# Patient Record
Sex: FEMALE | Race: WHITE | NOT HISPANIC OR LATINO | Employment: UNEMPLOYED | URBAN - METROPOLITAN AREA
[De-identification: names, ages, dates, MRNs, and addresses within clinical notes are randomized per-mention and may not be internally consistent; named-entity substitution may affect disease eponyms.]

---

## 2018-09-04 ENCOUNTER — OFFICE VISIT (OUTPATIENT)
Dept: OBSTETRICS AND GYNECOLOGY | Age: 58
End: 2018-09-04

## 2018-09-04 VITALS
WEIGHT: 166 LBS | BODY MASS INDEX: 24.59 KG/M2 | DIASTOLIC BLOOD PRESSURE: 80 MMHG | SYSTOLIC BLOOD PRESSURE: 140 MMHG | HEIGHT: 69 IN

## 2018-09-04 DIAGNOSIS — Z12.31 SCREENING MAMMOGRAM, ENCOUNTER FOR: Primary | ICD-10-CM

## 2018-09-04 DIAGNOSIS — Z12.4 ENCOUNTER FOR SCREENING FOR CERVICAL CANCER: ICD-10-CM

## 2018-09-04 DIAGNOSIS — N95.2 ATROPHIC VAGINITIS: ICD-10-CM

## 2018-09-04 PROCEDURE — 99386 PREV VISIT NEW AGE 40-64: CPT | Performed by: OBSTETRICS & GYNECOLOGY

## 2018-09-04 RX ORDER — CEPHALEXIN 250 MG/1
CAPSULE ORAL
Refills: 1 | COMMUNITY
Start: 2018-08-29 | End: 2021-02-04

## 2018-09-04 NOTE — PROGRESS NOTES
Routine Annual Visit    2018    Patient: Jinny Luciano          MR#:[id number]      Chief Complaint   Patient presents with   • Annual Exam     PT HERE FOR ROUTINE AE, HAD MG DONE . LAST C-SCOPE  NEVER HAD DEXA. NO PROBLEMS TODAY JUST IN NEED OF AE HAS BEEN YEARS SINCE LAST.       History of Present Illness    58 y.o. female  who presents for annual exam. Has been years since an annual. Last mammogram , unsure when last pap but probably around the same time. Denies abnl pap, mammogram was normal. Hx colonoscopy in . Maternal grandmother w breast ca at age 40, passed away age 42. Pt states that her mother and her multiple sisters have not had any cancers and not interested in genetic screening or enhanced breast ca screening.  Issue w postcoital UTI and taking antibiotic intermittently    No LMP recorded. Patient is postmenopausal.  Obstetric History:  OB History      Para Term  AB Living    7 7 7     7    SAB TAB Ectopic Molar Multiple Live Births                        Menstrual History:     No LMP recorded. Patient is postmenopausal.       Sexual History:   active w , declines STI screening. Some dysparuenia and dryness, uses [place]    ________________________________________  Patient Active Problem List   Diagnosis   • Encounter for screening for cervical cancer    • Screening mammogram, encounter for       Past Medical History:   Diagnosis Date   • Urinary tract infection        Past Surgical History:   Procedure Laterality Date   • APPENDECTOMY     •  SECTION         History   Smoking Status   • Never Smoker   Smokeless Tobacco   • Never Used       has a current medication list which includes the following prescription(s): cephalexin.  ________________________________________    Current contraception: none  History of abnormal Pap smear: no  Family history of Breast cancer: yes see above  Family history of uterine or ovarian cancer: no  Family History of colon  "cancer/colon polyps: yes - elderly uncle w colon ca  History of abnormal mammogram: no      The following portions of the patient's history were reviewed and updated as appropriate: allergies, current medications, past family history, past medical history, past social history, past surgical history and problem list.    Review of Systems    A comprehensive review of systems was negative.     Objective   Physical Exam    /80   Ht 175.3 cm (69\")   Wt 75.3 kg (166 lb)   BMI 24.51 kg/m²    BP Readings from Last 3 Encounters:   09/04/18 140/80      Wt Readings from Last 3 Encounters:   09/04/18 75.3 kg (166 lb)         BMI: Body mass index is 24.51 kg/m².       General:   alert, appears stated age and cooperative   Heart:: regular rate and rhythm, S1, S2 normal, no murmur, click, rub or gallop   Lungs: normal respiratory effort and auscultation   Abdomen: soft, non-tender, without masses or organomegaly   Breast: inspection negative, no nipple discharge or bleeding, no masses or nodularity palpable   Urethra and bladder: urethral meatus normal; bladder nontender to palpation;   Vulva: normal, Bartholin's, Urethra, Parkland's normal, atrophic, introitus slightly narrowed but no agglutination   Vagina: atrophic, slightly friable   Cervix: no lesions   Uterus: normal size or anteverted   Adnexa: normal adnexa and no mass, fullness, tenderness       Assessment:    normal annual exam       Plan:    Plan     [x]  Mammogram request made  [x]  PAP done  []  Labs:   []  GC/Chl/TV  []  DEXA scan- discussed would be early to start however has two family members with osteoporosis and could be candidate for early screening. Pt to contact insurance to see if would cover. Otherwise, Ca and vit D supplement discussed  []  Referral for colonoscopy:     Problems Addressed this Visit     Encounter for screening for cervical cancer     Relevant Orders    IgP, Aptima HPV    Screening mammogram, encounter for - Primary    Relevant Orders "    Mammo Screening Bilateral With CAD    Atrophic vaginitis     Discussed estrogen cream today and declines, discussed moisturization products and agrees to use, written information given today             BP slightly elevated today    Counseling    [x]  Nutrition  [x]  Physical activity/regular exercise   []  Healthy weight  []  Injury prevention  []  Smoking cessation  []  Substance misuse/abuse  [x]  Sexual behavior  []  STD prevention  []  Contraception  []  Dental health  []  Mental health  []  Immunization  []  Encouraged SBE      Patient's BMI is Body mass index is 24.51 kg/m²., which is classified as normal.     Liliam Chandler MD  09/04/2018  8:51 AM

## 2018-09-04 NOTE — PATIENT INSTRUCTIONS
Atrophic Vaginitis  Atrophic vaginitis is a condition in which the tissues that line the vagina become dry and thin. This condition is most common in women who have stopped having regular menstrual periods (menopause). This usually starts when a woman is 45-55 years old.  Estrogen helps to keep the vagina moist. It stimulates the vagina to produce a clear fluid that lubricates the vagina for sexual intercourse. This fluid also protects the vagina from infection. Lack of estrogen can cause the lining of the vagina to get thinner and dryer. The vagina may also shrink in size. It may become less elastic. Atrophic vaginitis tends to get worse over time as a woman's estrogen level drops.  What are the causes?  This condition is caused by the normal drop in estrogen that happens around the time of menopause.  What increases the risk?  Certain conditions or situations may lower a woman's estrogen level, which increases her risk of atrophic vaginitis. These include:  · Taking medicine that blocks estrogen.  · Having ovaries removed surgically.  · Being treated for cancer with X-ray treatment (radiation) or medicines (chemotherapy).  · Exercising very hard and often.  · Having an eating disorder (anorexia).  · Giving birth or breastfeeding.  · Being over the age of 50.  · Smoking.    What are the signs or symptoms?  Symptoms of this condition include:  · Pain, soreness, or bleeding during sexual intercourse (dyspareunia).  · Vaginal burning, irritation, or itching.  · Pain or bleeding during a vaginal examination using a speculum (pelvic exam).  · Loss of interest in sexual activity.  · Having burning pain when passing urine.  · Vaginal discharge that is brown or yellow.    In some cases, there are no symptoms.  How is this diagnosed?  This condition is diagnosed with a medical history and physical exam. This will include a pelvic exam that checks whether the inside of your vagina appears pale, thin, or dry. Rarely, you may  also have other tests, including:  · A urine test.  · A test that checks the acid balance in your vaginal fluid (acid balance test).    How is this treated?  Treatment for this condition may depend on the severity of your symptoms. Treatment may include:  · Using an over-the-counter vaginal lubricant before you have sexual intercourse.  · Using a long-acting vaginal moisturizer.  · Using low-dose vaginal estrogen for moderate to severe symptoms that do not respond to other treatments. Options include creams, tablets, and inserts (vaginal rings). Before using vaginal estrogen, tell your health care provider if you have a history of:  ? Breast cancer.  ? Endometrial cancer.  ? Blood clots.  · Taking medicines. You may be able to take a daily pill for dyspareunia. Discuss all of the risks of this medicine with your health care provider. It is usually not recommended for women who have a family history or personal history of breast cancer.    If your symptoms are very mild and you are not sexually active, you may not need treatment.  Follow these instructions at home:  · Take medicines only as directed by your health care provider. Do not use herbal or alternative medicines unless your health care provider says that you can.  · Use over-the-counter creams, lubricants, or moisturizers for dryness only as directed by your health care provider.  · If your atrophic vaginitis is caused by menopause, discuss all of your menopausal symptoms and treatment options with your health care provider.  · Do not douche.  · Do not use products that can make your vagina dry. These include:  ? Scented feminine sprays.  ? Scented tampons.  ? Scented soaps.  · If it hurts to have sex, talk with your sexual partner.  Contact a health care provider if:  · Your discharge looks different than normal.  · Your vagina has an unusual smell.  · You have new symptoms.  · Your symptoms do not improve with treatment.  · Your symptoms get worse.  This  information is not intended to replace advice given to you by your health care provider. Make sure you discuss any questions you have with your health care provider.  Document Released: 05/03/2016 Document Revised: 05/25/2017 Document Reviewed: 12/09/2015  [employer] Interactive Patient Education © 2018 [employer]

## 2018-09-04 NOTE — ASSESSMENT & PLAN NOTE
Discussed estrogen cream today and declines, discussed moisturization products and agrees to use, written information given today

## 2018-09-06 LAB
CYTOLOGIST CVX/VAG CYTO: NORMAL
CYTOLOGY CVX/VAG DOC THIN PREP: NORMAL
DX ICD CODE: NORMAL
HIV 1 & 2 AB SER-IMP: NORMAL
HPV I/H RISK 4 DNA CVX QL PROBE+SIG AMP: NEGATIVE
Lab: NORMAL
OTHER STN SPEC: NORMAL
PATH REPORT.FINAL DX SPEC: NORMAL
STAT OF ADQ CVX/VAG CYTO-IMP: NORMAL

## 2018-09-07 ENCOUNTER — TELEPHONE (OUTPATIENT)
Dept: OBSTETRICS AND GYNECOLOGY | Age: 58
End: 2018-09-07

## 2018-09-07 NOTE — TELEPHONE ENCOUNTER
----- Message from Liliam Chandler MD sent at 9/7/2018 10:39 AM EDT -----  Please inform of normal pap and negative HPV    ----- Message -----  From: Raquel, Reflab Results In  Sent: 9/6/2018   4:36 PM  To: Liliam Chandler MD

## 2018-12-17 ENCOUNTER — APPOINTMENT (OUTPATIENT)
Dept: WOMENS IMAGING | Facility: HOSPITAL | Age: 58
End: 2018-12-17

## 2018-12-17 PROCEDURE — 77067 SCR MAMMO BI INCL CAD: CPT | Performed by: RADIOLOGY

## 2018-12-17 PROCEDURE — 77063 BREAST TOMOSYNTHESIS BI: CPT | Performed by: RADIOLOGY

## 2019-12-18 ENCOUNTER — APPOINTMENT (OUTPATIENT)
Dept: WOMENS IMAGING | Facility: HOSPITAL | Age: 59
End: 2019-12-18

## 2019-12-18 PROCEDURE — 77067 SCR MAMMO BI INCL CAD: CPT | Performed by: RADIOLOGY

## 2019-12-18 PROCEDURE — 77063 BREAST TOMOSYNTHESIS BI: CPT | Performed by: RADIOLOGY

## 2021-01-20 ENCOUNTER — APPOINTMENT (OUTPATIENT)
Dept: WOMENS IMAGING | Facility: HOSPITAL | Age: 61
End: 2021-01-20

## 2021-01-20 PROCEDURE — 77063 BREAST TOMOSYNTHESIS BI: CPT | Performed by: RADIOLOGY

## 2021-01-20 PROCEDURE — 77067 SCR MAMMO BI INCL CAD: CPT | Performed by: RADIOLOGY

## 2021-02-04 ENCOUNTER — OFFICE VISIT (OUTPATIENT)
Dept: INTERNAL MEDICINE | Facility: CLINIC | Age: 61
End: 2021-02-04

## 2021-02-04 ENCOUNTER — HOSPITAL ENCOUNTER (OUTPATIENT)
Dept: GENERAL RADIOLOGY | Facility: HOSPITAL | Age: 61
Discharge: HOME | End: 2021-02-04
Admitting: NURSE PRACTITIONER

## 2021-02-04 VITALS
OXYGEN SATURATION: 98 % | SYSTOLIC BLOOD PRESSURE: 152 MMHG | HEIGHT: 69 IN | HEART RATE: 87 BPM | DIASTOLIC BLOOD PRESSURE: 80 MMHG | BODY MASS INDEX: 26.22 KG/M2 | WEIGHT: 177 LBS

## 2021-02-04 DIAGNOSIS — Z00.00 HEALTHCARE MAINTENANCE: Primary | ICD-10-CM

## 2021-02-04 DIAGNOSIS — M54.50 LUMBAR BACK PAIN: ICD-10-CM

## 2021-02-04 DIAGNOSIS — R03.0 ELEVATED BP WITHOUT DIAGNOSIS OF HYPERTENSION: ICD-10-CM

## 2021-02-04 DIAGNOSIS — Z11.59 ENCOUNTER FOR HEPATITIS C SCREENING TEST FOR LOW RISK PATIENT: ICD-10-CM

## 2021-02-04 PROCEDURE — 99386 PREV VISIT NEW AGE 40-64: CPT | Performed by: NURSE PRACTITIONER

## 2021-02-04 PROCEDURE — 72100 X-RAY EXAM L-S SPINE 2/3 VWS: CPT

## 2021-02-04 NOTE — PROGRESS NOTES
"Subjective   Jinny Luciano is a 60 y.o. female and is here for a comprehensive physical exam. New patient here to establish care.  Health history and questionnaire have been reviewed in its entirety. The patient's previous primary care provider was Dr. Sam Smith   The patient reports :    C/o back pain x 2 months. No specific injury. Some pain and numbness in legs but that has improved. Worse when getting out of bed, out of car, and sitting.  She has gone to physical therapy with some help. Ibuprofen with some help. Denies any specific injury    C/o Rash on lower back for several months. Her previous physician had prescribed triamcinolone cream with minimal success. She has a dermatologist and is going to make an appointment with her    Do you take any herbs or supplements that were not prescribed by a doctor? see list     History:  LMP: No LMP recorded. Patient is postmenopausal.  Patient sees Dr. Novak. Last pelvic exam was 2019    The following portions of the patient's history were reviewed and updated as appropriate: allergies, current medications, past family history, past medical history, past social history, past surgical history and problem list.    Review of Systems  Do you have pain that bothers you in your daily life? no  Pertinent items are noted in HPI.    Objective   /80 (BP Location: Left arm, Patient Position: Sitting, Cuff Size: Large Adult)   Pulse 87   Ht 175.3 cm (69\")   Wt 80.3 kg (177 lb)   SpO2 98%   BMI 26.14 kg/m²     General Appearance:    Alert, cooperative, no distress, appears stated age   Head:    Normocephalic, without obvious abnormality, atraumatic   Eyes:    PERRL, conjunctiva/corneas clear, EOM's intact, both eyes   Ears:    Normal TM's and external ear canals, both ears   Nose:   Deferred due to mask   Throat:   Deferred due to mask   Neck:   Supple, symmetrical, trachea midline, no adenopathy;     thyroid:  no enlargement/tenderness/nodules; no carotid    bruit "   Back:     Symmetric, no curvature, ROM normal, tenderness left lower lumbar spine; straight leg raise negative   Lungs:     Clear to auscultation bilaterally, respirations unlabored   Chest Wall:    No tenderness or deformity    Heart:    Regular rate and rhythm, S1 and S2 normal, no murmur       Abdomen:     Soft, non-tender, bowel sounds active all four quadrants,     no masses, no organomegaly           Extremities:   Extremities normal, atraumatic, no cyanosis or edema   Pulses:   2+ and symmetric all extremities   Skin:   Skin color, texture, turgor normal, papular rash lower mid back   Lymph nodes:   Cervical, supraclavicular, and axillary nodes normal   Neurologic:   Grossly intact, normal strength, sensation and reflexes     throughout        Assessment/Plan   Healthy female exam.      1. Diagnoses and all orders for this visit:    1. Healthcare maintenance (Primary)  -     Comprehensive Metabolic Panel; Future  -     CBC & Differential; Future  -     Lipid Panel With / Chol / HDL Ratio; Future  -     Vitamin D 25 Hydroxy; Future    2. Lumbar back pain  -     XR Spine Lumbar 2 or 3 View  -     Ambulatory Referral to Physical Therapy Evaluate and treat    3. Elevated BP without diagnosis of hypertension    4. Encounter for hepatitis C screening test for low risk patient  -     Hepatitis C Antibody; Future    Elevated BP -1 patient returns for her fasting lab work, will recheck her blood pressure at that time.  States that she has never had any problems with elevated blood pressure in the past    2. Patient Counseling:  --Nutrition: Stressed importance of moderation in sodium/caffeine intake, saturated fat and cholesterol, caloric balance, sufficient intake of fresh fruits, vegetables, fiber, calcium, iron.  --Exercise: Stressed the importance of regular exercise. Walking 2 1/2 to 3 miles a day  --Injury prevention: Discussed safety belts, safety helmets, smoke detector.   --Dental health: Discussed  importance of regular tooth brushing, flossing, and dental visits.  --Immunizations reviewed.    3. Discussed the patient's BMI with her.  The BMI is in the acceptable range  4. Follow up for fasting labs

## 2021-03-16 ENCOUNTER — BULK ORDERING (OUTPATIENT)
Dept: CASE MANAGEMENT | Facility: OTHER | Age: 61
End: 2021-03-16

## 2021-03-16 DIAGNOSIS — Z23 IMMUNIZATION DUE: ICD-10-CM

## 2024-03-09 ENCOUNTER — APPOINTMENT (OUTPATIENT)
Dept: GENERAL RADIOLOGY | Facility: HOSPITAL | Age: 64
End: 2024-03-09
Payer: COMMERCIAL

## 2024-03-09 ENCOUNTER — HOSPITAL ENCOUNTER (EMERGENCY)
Facility: HOSPITAL | Age: 64
Discharge: HOME | End: 2024-03-09
Attending: EMERGENCY MEDICINE
Payer: COMMERCIAL

## 2024-03-09 VITALS
OXYGEN SATURATION: 99 % | DIASTOLIC BLOOD PRESSURE: 94 MMHG | RESPIRATION RATE: 18 BRPM | TEMPERATURE: 97.8 F | SYSTOLIC BLOOD PRESSURE: 149 MMHG | HEART RATE: 90 BPM

## 2024-03-09 DIAGNOSIS — S42.202A CLOSED FRACTURE OF PROXIMAL END OF LEFT HUMERUS, UNSPECIFIED FRACTURE MORPHOLOGY, INITIAL ENCOUNTER: Primary | ICD-10-CM

## 2024-03-09 PROCEDURE — 73030 X-RAY EXAM OF SHOULDER: CPT

## 2024-03-09 PROCEDURE — 99283 EMERGENCY DEPT VISIT LOW MDM: CPT

## 2024-03-09 RX ORDER — HYDROCODONE BITARTRATE AND ACETAMINOPHEN 5; 325 MG/1; MG/1
1 TABLET ORAL EVERY 6 HOURS PRN
Qty: 12 TABLET | Refills: 0 | Status: SHIPPED | OUTPATIENT
Start: 2024-03-09

## 2024-03-09 RX ORDER — HYDROCODONE BITARTRATE AND ACETAMINOPHEN 5; 325 MG/1; MG/1
1 TABLET ORAL EVERY 6 HOURS PRN
Qty: 12 TABLET | Refills: 0 | Status: SHIPPED | OUTPATIENT
Start: 2024-03-09 | End: 2024-03-09

## 2024-03-09 RX ORDER — IBUPROFEN 800 MG/1
800 TABLET ORAL ONCE
Status: COMPLETED | OUTPATIENT
Start: 2024-03-09 | End: 2024-03-09

## 2024-03-09 RX ADMIN — IBUPROFEN 800 MG: 800 TABLET, FILM COATED ORAL at 13:46

## 2024-03-09 NOTE — DISCHARGE INSTRUCTIONS
Sling for comfort, pain medications as prescribed as needed for severe pain, otherwise ibuprofen can be used, orthopedic follow-up on Monday as discussed, ED return for worsening symptoms as needed.

## 2024-03-09 NOTE — ED PROVIDER NOTES
EMERGENCY DEPARTMENT ENCOUNTER    Room Number:    PCP: Megan Mansfield APRN  Historian: Patient      HPI:  Chief Complaint: Left shoulder pain  A complete HPI/ROS/PMH/PSH/SH/FH are unobtainable due to: None    Context: Jinny Luciano is a 63 y.o. female who presents to the ED via private vehicle from home c/o acute left shoulder pain that occurred after she tripped and fell at home with her arm outstretched.  Felt a pop.  Her daughter, who is a PT, thought it might be dislocated and attempted to reduce it at home.  This was unsuccessful.  Has limited tingling in the left fourth and fifth digits.  Able to move all digits.  Complain of some right hip pain but able to ambulate and bear weight.      MEDICAL RECORD REVIEW    External (non-ED) record review: No anticoagulants noted on chart review in epic              PAST MEDICAL HISTORY  Active Ambulatory Problems     Diagnosis Date Noted    Encounter for screening for cervical cancer 2018    Screening mammogram, encounter for 2018    Atrophic vaginitis 2018     Resolved Ambulatory Problems     Diagnosis Date Noted    No Resolved Ambulatory Problems     Past Medical History:   Diagnosis Date    Urinary tract infection          PAST SURGICAL HISTORY  Past Surgical History:   Procedure Laterality Date    APPENDECTOMY      1991     SECTION      , , , , ,           FAMILY HISTORY  Family History   Problem Relation Age of Onset    Osteoporosis Mother     Heart disease Mother     Osteoporosis Sister     Breast cancer Maternal Grandmother     Hypertension Father     Arthritis Father     Hyperlipidemia Brother     Hyperlipidemia Brother          SOCIAL HISTORY  Social History     Socioeconomic History    Marital status: Single   Tobacco Use    Smoking status: Never    Smokeless tobacco: Never   Substance and Sexual Activity    Alcohol use: Yes     Comment: 2 GLASSES OF WINE DAILY-RED     Drug use: No    Sexual activity: Yes      Partners: Male     Comment: mild dyspareunia 2/2 atrophy         ALLERGIES  Patient has no known allergies.        REVIEW OF SYSTEMS  Review of Systems     All systems reviewed and negative except for those discussed in HPI.       PHYSICAL EXAM    I have reviewed the triage vital signs and nursing notes.    ED Triage Vitals   Temp Heart Rate Resp BP SpO2   03/09/24 1258 03/09/24 1258 03/09/24 1258 03/09/24 1324 03/09/24 1258   97.8 °F (36.6 °C) 96 20 (!) 150/102 99 %      Temp src Heart Rate Source Patient Position BP Location FiO2 (%)   03/09/24 1258 03/09/24 1258 03/09/24 1324 03/09/24 1324 --   Tympanic Monitor Sitting Right arm        Physical Exam  General: No acute distress, nontoxic  HEENT: EOMI  Pulm: Symmetric chest rise, nonlabored breathing  CV: Regular rate and rhythm  GI: Nondistended  MSK: Left proximal shoulder tenderness palpation without palpable joint deformity  Skin: Warm, dry  Neuro: Awake, alert, oriented x 4, moving all extremities, neurovascular intact distal left upper extremity, no focal deficits  Psych: Calm, cooperative    Vital signs and nursing notes reviewed.         LAB RESULTS  No results found for this or any previous visit (from the past 24 hour(s)).    Ordered the above labs and independently interpreted results. My findings will be discussed in the medical decision making section below        RADIOLOGY  XR Shoulder 2+ View Left    Result Date: 3/9/2024  2 VIEW LEFT SHOULDER  HISTORY: Fell. Pain.  FINDINGS: There is a nondisplaced fracture through the neck of the humerus associated with a vertical fracture extending through the greater tubercle with separation of the vertical fracture component measuring 3 mm.  This report was finalized on 3/9/2024 2:14 PM by Dr. Nicholas Angelo M.D on Workstation: [id number]       Ordered the above noted radiological studies.  Independently interpreted by me and my independent review of findings can be found in the ED Course.  See dictation for  official radiology interpretation.      PROCEDURES    Procedures        MEDICATIONS GIVEN IN ER    Medications   ibuprofen (ADVIL,MOTRIN) tablet 800 mg (800 mg Oral Given 3/9/24 1346)         PROGRESS, DATA ANALYSIS, CONSULTS, AND MEDICAL DECISION MAKING    Please note that this section constitutes my independent interpretation of clinical data including lab results, radiology, EKG's.  This constitutes my independent professional opinion regarding differential diagnosis and management of this patient.  It may include any factors such as history from outside sources, review of external records, social determinants of health, management of medications, response to those treatments, and discussions with other providers.    Differential Diagnosis and Plan: Initial concern for left shoulder fracture versus dislocation versus fracture/dislocation.  Other consideration for possible clavicle injury, rotator cuff injury, soft tissue contusion.  Plan for x-ray and reevaluation with results.    Additional sources:  - Discussed/ obtained information from independent historians:       - (Social Determinants of Health): None     - Shared decision making:  Patient and  and daughter at bedside fully updated on and in agreement with the course and plan moving forward    ED Course as of 03/09/24 1907   Sat Mar 09, 2024   1334 XR Shoulder 2+ View Left  Per my independent interpretation of the left shoulder x-ray, patient has approximately numerous fracture without obvious evidence of dislocation [DC]   1417 XR Shoulder 2+ View Left  Radiology report reviewed, nondisplaced fracture through the neck of the humerus associate with a vertical fracture extending through the greater tubercle with separation of the vertical fracture, measuring 3 mm [DC]      ED Course User Index  [DC] Ru Loving MD       Hospitalization Considered?:     Orders Placed During This Visit:  Orders Placed This Encounter   Procedures    [facility]  03.  Sling & Swathe    XR Shoulder 2+ View Left       Additional orders considered but not placed:      Independent interpretation of labs, radiology studies, and discussions with consultants: See ED Course        AS OF 19:07 EST VITALS:    BP - 149/94  HR - 90  TEMP - 97.8 °F (36.6 °C) (Tympanic)  02 SATS - 99%          DIAGNOSIS  Final diagnoses:   Closed fracture of proximal end of left humerus, unspecified fracture morphology, initial encounter         DISPOSITION  ED Disposition       ED Disposition   Discharge    Condition   Stable    Comment   --                Please note that portions of this document were completed with a voice recognition program.    Note Disclaimer: At [facility], we believe that sharing information builds trust and better relationships. You are receiving this note because you recently visited [facility]. It is possible you will see health information before a provider has talked with you about it. This kind of information can be easy to misunderstand. To help you fully understand what it means for your health, we urge you to discuss this note with your provider.                       Ru Loving MD  03/09/24 1956

## 2024-03-14 ENCOUNTER — OFFICE VISIT (OUTPATIENT)
Dept: ORTHOPEDIC SURGERY | Facility: CLINIC | Age: 64
End: 2024-03-14
Payer: COMMERCIAL

## 2024-03-14 VITALS
BODY MASS INDEX: 26.01 KG/M2 | SYSTOLIC BLOOD PRESSURE: 148 MMHG | WEIGHT: 175.6 LBS | HEIGHT: 69 IN | DIASTOLIC BLOOD PRESSURE: 84 MMHG

## 2024-03-14 DIAGNOSIS — S42.202A CLOSED FRACTURE OF PROXIMAL END OF LEFT HUMERUS, UNSPECIFIED FRACTURE MORPHOLOGY, INITIAL ENCOUNTER: Primary | ICD-10-CM

## 2024-03-14 RX ORDER — IBUPROFEN 800 MG/1
800 TABLET ORAL EVERY 6 HOURS PRN
COMMUNITY

## 2024-03-14 RX ORDER — TRAMADOL HYDROCHLORIDE 50 MG/1
50-100 TABLET ORAL EVERY 4 HOURS PRN
Qty: 50 TABLET | Refills: 0 | Status: SHIPPED | OUTPATIENT
Start: 2024-03-14 | End: 2024-03-21 | Stop reason: SDUPTHER

## 2024-03-14 RX ORDER — DICLOFENAC SODIUM 75 MG/1
75 TABLET, DELAYED RELEASE ORAL 2 TIMES DAILY
Qty: 42 TABLET | Refills: 0 | Status: SHIPPED | OUTPATIENT
Start: 2024-03-14 | End: 2024-03-21 | Stop reason: SDUPTHER

## 2024-03-14 NOTE — PROGRESS NOTES
Subjective:     Patient ID: Jinny Luciano is a 63 y.o. female.    Chief Complaint:  Left shoulder pain, new patient  History of Present Illness  Jinny Luciano presents to clinic today for evaluation of left shoulder-patient fell at her home on Saturday, states she struck the lateral aspect of her arm at that point in time in her kitchen noted immediate onset of pain.  She was taken emergency department for further evaluation noted to have a proximal humerus fracture.  She denies prior injury or issue with her left arm, she is right-hand dominant.  She denies any associated numbness or tingling.  Mild radiating pain down the lateral aspect of the arm.  Rates pain as 8 out of 10, throbbing stabbing in nature with associated swelling bruising and stiffness.  Mild improvement with ice and rest.  Exacerbated with any attempts at motion or local pressure.  Moderate improvement with use of ibuprofen.     Social History     Occupational History    Not on file   Tobacco Use    Smoking status: Never     Passive exposure: Never    Smokeless tobacco: Never   Vaping Use    Vaping status: Never Used   Substance and Sexual Activity    Alcohol use: Yes     Comment: 2 GLASSES OF WINE DAILY-RED     Drug use: No    Sexual activity: Yes     Partners: Male     Comment: mild dyspareunia 2/2 atrophy      Past Medical History:   Diagnosis Date    Urinary tract infection      Past Surgical History:   Procedure Laterality Date    APPENDECTOMY      1991     SECTION      , , , , ,         Family History   Problem Relation Age of Onset    Osteoporosis Mother     Heart disease Mother     Osteoporosis Sister     Breast cancer Maternal Grandmother     Hypertension Father     Arthritis Father     Hyperlipidemia Brother     Hyperlipidemia Brother          Review of Systems        Objective:  There were no vitals filed for this visit.  There were no vitals filed for this visit.  There is no height or weight on file to  calculate BMI.  Physical Exam    Vital signs reviewed.   General: No acute distress, alert and oriented  Eyes: conjunctiva clear; pupils equally round and reactive  ENT: external ears and nose atraumatic; oropharynx clear  CV: no peripheral edema  Resp: normal respiratory effort  Skin: no rashes or wounds; normal turgor  Psych: mood and affect appropriate; recent and remote memory intact        Ortho Exam     Left shoulder-maximal tenderness palpation over proximal humerus particular lateral aspect with moderate soft tissue swelling noted, mild evidence of ecchymosis.  Positive sensation over proximal lateral arm.  Limited motion or mobility through the shoulder secondary to significant pain.  Patient is able to actively flex and extend left wrist as well as all digits of left hand at MCP and IP joints with 4+ out of 5 strength.  Positive sensation light touch all distributions left hand symmetric to the right.  Imagin VIEW LEFT SHOULDER     HISTORY: Fell. Pain.     FINDINGS: There is a nondisplaced fracture through the neck of the  humerus associated with a vertical fracture extending through the  greater tubercle with separation of the vertical fracture component  measuring 3 mm.     This report was finalized on 3/9/2024 2:14 PM by Dr. Nicholas Angelo M.D  on Workstation: [id number]      Left Shoulder X-Ray  Indication: Pain  AP, scapular Y, and axillary lateral views    Findings:  X-rays of the left proximal humerus from today's visit indicate fairly stable alignment of proximal humerus fracture as compared to ER x-rays from March night indicating a nondisplaced surgical neck fracture with a vertical fracture through the greater tuberosity with mild displacement noted, minimal evidence of proximal migration of the fragment.  No evidence of callus formation.  Mild inferior position of humeral head noted but no charlotte evidence of charlotte dislocation or subluxation.  Compared to prior x-rays as noted above from  emergency department on March 9, 2024    Assessment:        1. Closed fracture of proximal end of left humerus, unspecified fracture morphology, initial encounter           Plan:          Discussed treatment options at length with patient at today's visit.  At this point in time we discussed options for open reduction internal fixation as well as closed treatment.  Given the relatively acceptable alignment of the fracture site, patient elected to proceed with closed treatment at this point.  We will continue use of sling, I will give her a improved sling with an abduction pillow for better stabilization  Prescription given for tramadol to try to help with pain at this point in time  Recommended ice and continuous immobilization of the left shoulder  Follow up: 1 week with repeat x-rays to assess for any displacement      Jinny Luciano was in agreement with plan and had all questions answered.     Orders:  Orders Placed This Encounter   Procedures    XR Shoulder 2+ View Left       Medications:  No orders of the defined types were placed in this encounter.      Followup:  No follow-ups on file.    Diagnoses and all orders for this visit:    1. Closed fracture of proximal end of left humerus, unspecified fracture morphology, initial encounter (Primary)  -     XR Shoulder 2+ View Left          Dictated utilizing Dragon dictation

## 2024-03-21 ENCOUNTER — OFFICE VISIT (OUTPATIENT)
Dept: ORTHOPEDIC SURGERY | Facility: CLINIC | Age: 64
End: 2024-03-21
Payer: COMMERCIAL

## 2024-03-21 VITALS
DIASTOLIC BLOOD PRESSURE: 92 MMHG | WEIGHT: 175 LBS | HEIGHT: 69 IN | BODY MASS INDEX: 25.92 KG/M2 | SYSTOLIC BLOOD PRESSURE: 148 MMHG

## 2024-03-21 DIAGNOSIS — S42.202A CLOSED FRACTURE OF PROXIMAL END OF LEFT HUMERUS, UNSPECIFIED FRACTURE MORPHOLOGY, INITIAL ENCOUNTER: Primary | ICD-10-CM

## 2024-03-21 PROCEDURE — 99024 POSTOP FOLLOW-UP VISIT: CPT | Performed by: ORTHOPAEDIC SURGERY

## 2024-03-21 RX ORDER — TRAMADOL HYDROCHLORIDE 50 MG/1
50-100 TABLET ORAL EVERY 4 HOURS PRN
Qty: 60 TABLET | Refills: 0 | Status: SHIPPED | OUTPATIENT
Start: 2024-03-21

## 2024-03-21 RX ORDER — DICLOFENAC SODIUM 75 MG/1
75 TABLET, DELAYED RELEASE ORAL 2 TIMES DAILY
Qty: 42 TABLET | Refills: 0 | Status: SHIPPED | OUTPATIENT
Start: 2024-03-21

## 2024-03-21 NOTE — PROGRESS NOTES
"Subjective:     Patient ID: Jinny Luciano is a 63 y.o. female.    Chief Complaint:  Follow-up left shoulder pain, proximal humerus fracture  History of Present Illness  Jinny Luciano returns to clinic today for evaluation of left shoulder, states that her pain has improved significantly since time of last visit.  She still does have moderate pain localized primarily to the anterior lateral aspect of her left shoulder.  Denies any numbness or tingling.  She is tolerating her sling as well as tramadol intermittently for pain control     Social History     Occupational History    Not on file   Tobacco Use    Smoking status: Never     Passive exposure: Never    Smokeless tobacco: Never   Vaping Use    Vaping status: Never Used   Substance and Sexual Activity    Alcohol use: Yes     Comment: 2 GLASSES OF WINE DAILY-RED     Drug use: No    Sexual activity: Yes     Partners: Male     Comment: mild dyspareunia 2/2 atrophy      Past Medical History:   Diagnosis Date    Urinary tract infection      Past Surgical History:   Procedure Laterality Date    APPENDECTOMY           SECTION      , , , , ,         Family History   Problem Relation Age of Onset    Osteoporosis Mother     Heart disease Mother     Osteoporosis Sister     Breast cancer Maternal Grandmother     Hypertension Father     Arthritis Father     Hyperlipidemia Brother     Hyperlipidemia Brother          Review of Systems        Objective:  Vitals:    24 1003   Weight: 79.4 kg (175 lb)   Height: 175.3 cm (69\")         24  1003   Weight: 79.4 kg (175 lb)     Body mass index is 25.84 kg/m².  General: No acute distress.  Resp: normal respiratory effort  Skin: no rashes or wounds; normal turgor  Psych: mood and affect appropriate; recent and remote memory intact        Ortho Exam     Left shoulder-moderate improvement of prior swelling and ecchymosis, she is able to flex and extend all digits left hand MCP and IP joints.  " Positive deltoid firing with light resisted abduction.  Tolerates pendulum exercise through a 60 degree arc of motion passively.    Imaging:  Repeat x-rays of left shoulder from today's visit, 3 views, AP, Scap Y, axillary lateral, ordered and reviewed by me, compared to prior x-rays indicates fairly stable alignment of proximal humerus fracture with surgical neck fracture still nondisplaced at this point in time, no significant or defined evidence of any displacement of the vertical fracture through the greater tuberosity.  Still with some evidence of inferior positioning of the humeral head in relation to the glenoid but no evidence of charlotte dislocation or subluxation.    Assessment:        1. Closed fracture of proximal end of left humerus, unspecified fracture morphology, initial encounter           Plan:          Discussed treatment options at length with patient at today's visit.  At this point in time we will continue with close treatment.  Reviewed again option for advanced imaging as well as surgical treatment but at this point in time her fracture alignment appears to be acceptable at this point in time  Continue with focus utilization of sling for stabilization  Follow up: 3 to 4 weeks repeat x-rays left shoulder or sooner if needed      Jinny Luciano was in agreement with plan and had all questions answered.     Orders:  No orders of the defined types were placed in this encounter.      Medications:  No orders of the defined types were placed in this encounter.      Followup:  No follow-ups on file.    Diagnoses and all orders for this visit:    1. Closed fracture of proximal end of left humerus, unspecified fracture morphology, initial encounter (Primary)          Dictated utilizing Dragon dictation

## 2024-03-23 DIAGNOSIS — S42.202A CLOSED FRACTURE OF PROXIMAL END OF LEFT HUMERUS, UNSPECIFIED FRACTURE MORPHOLOGY, INITIAL ENCOUNTER: Primary | ICD-10-CM

## 2024-03-23 RX ORDER — TRAMADOL HYDROCHLORIDE 50 MG/1
50-100 TABLET ORAL EVERY 4 HOURS PRN
Qty: 50 TABLET | Refills: 0 | Status: SHIPPED | OUTPATIENT
Start: 2024-03-23

## 2024-04-11 ENCOUNTER — HOSPITAL ENCOUNTER (OUTPATIENT)
Dept: MRI IMAGING | Facility: HOSPITAL | Age: 64
Discharge: HOME | End: 2024-04-11
Admitting: ORTHOPAEDIC SURGERY
Payer: COMMERCIAL

## 2024-04-11 ENCOUNTER — OFFICE VISIT (OUTPATIENT)
Dept: ORTHOPEDIC SURGERY | Facility: CLINIC | Age: 64
End: 2024-04-11
Payer: COMMERCIAL

## 2024-04-11 VITALS — WEIGHT: 175 LBS | HEIGHT: 69 IN | BODY MASS INDEX: 25.92 KG/M2

## 2024-04-11 DIAGNOSIS — M25.312 SHOULDER INSTABILITY, LEFT: ICD-10-CM

## 2024-04-11 DIAGNOSIS — S42.202A CLOSED FRACTURE OF PROXIMAL END OF LEFT HUMERUS, UNSPECIFIED FRACTURE MORPHOLOGY, INITIAL ENCOUNTER: Primary | ICD-10-CM

## 2024-04-11 DIAGNOSIS — S42.202A CLOSED FRACTURE OF PROXIMAL END OF LEFT HUMERUS, UNSPECIFIED FRACTURE MORPHOLOGY, INITIAL ENCOUNTER: ICD-10-CM

## 2024-04-11 PROCEDURE — 73221 MRI JOINT UPR EXTREM W/O DYE: CPT

## 2024-04-25 ENCOUNTER — OFFICE VISIT (OUTPATIENT)
Dept: ORTHOPEDIC SURGERY | Facility: CLINIC | Age: 64
End: 2024-04-25
Payer: COMMERCIAL

## 2024-04-25 VITALS — BODY MASS INDEX: 25.92 KG/M2 | HEIGHT: 69 IN | WEIGHT: 175 LBS

## 2024-04-25 DIAGNOSIS — S42.202A CLOSED FRACTURE OF PROXIMAL END OF LEFT HUMERUS, UNSPECIFIED FRACTURE MORPHOLOGY, INITIAL ENCOUNTER: Primary | ICD-10-CM

## 2024-04-25 RX ORDER — SENNOSIDES 8.6 MG
650 CAPSULE ORAL EVERY 8 HOURS PRN
COMMUNITY

## 2024-04-25 RX ORDER — IBUPROFEN 400 MG/1
400 TABLET ORAL EVERY 6 HOURS PRN
COMMUNITY

## 2024-04-26 DIAGNOSIS — S42.202A CLOSED FRACTURE OF PROXIMAL END OF LEFT HUMERUS, UNSPECIFIED FRACTURE MORPHOLOGY, INITIAL ENCOUNTER: Primary | ICD-10-CM

## 2024-05-23 ENCOUNTER — OFFICE VISIT (OUTPATIENT)
Dept: ORTHOPEDIC SURGERY | Facility: CLINIC | Age: 64
End: 2024-05-23
Payer: MEDICARE

## 2024-05-23 VITALS — HEIGHT: 69 IN | BODY MASS INDEX: 25.92 KG/M2 | WEIGHT: 175 LBS

## 2024-05-23 DIAGNOSIS — S42.202A CLOSED FRACTURE OF PROXIMAL END OF LEFT HUMERUS, UNSPECIFIED FRACTURE MORPHOLOGY, INITIAL ENCOUNTER: Primary | ICD-10-CM

## 2024-05-23 NOTE — PROGRESS NOTES
"Subjective:     Patient ID: Jinny Luciano is a 64 y.o. female.    Chief Complaint:  Follow-up left shoulder pain, proximal humerus fracture-date of injury 3/9/2024    History of Present Illness  Jinny Luciano returns to clinic today for evaluation of left shoulder, states that overall she is doing better at this point in time, has noted improvement in regards to pain, function, and motion.  She has started physical therapy and this is helping her to achieve better use of her left shoulder and arm.  Still notes some moderate pain, intermittent in nature, localized to the anterior and lateral aspect of her shoulder.  Denies any numbness or tingling, denies radiation of pain.     Social History     Occupational History    Not on file   Tobacco Use    Smoking status: Never     Passive exposure: Never    Smokeless tobacco: Never   Vaping Use    Vaping status: Never Used   Substance and Sexual Activity    Alcohol use: Yes     Comment: 2 GLASSES OF WINE DAILY-RED     Drug use: No    Sexual activity: Yes     Partners: Male     Comment: mild dyspareunia 2/2 atrophy      Past Medical History:   Diagnosis Date    Urinary tract infection      Past Surgical History:   Procedure Laterality Date    APPENDECTOMY      1991     SECTION      , , , , ,         Family History   Problem Relation Age of Onset    Osteoporosis Mother     Heart disease Mother     Osteoporosis Sister     Breast cancer Maternal Grandmother     Hypertension Father     Arthritis Father     Hyperlipidemia Brother     Hyperlipidemia Brother          Review of Systems        Objective:  Vitals:    24 1034   Weight: 79.4 kg (175 lb)   Height: 175.3 cm (69\")         24  1034   Weight: 79.4 kg (175 lb)     Body mass index is 25.84 kg/m².  General: No acute distress.  Resp: normal respiratory effort  Skin: no rashes or wounds; normal turgor  Psych: mood and affect appropriate; recent and remote memory intact        Ortho Exam   "   Left shoulder-tolerates passive forward flexion to 120 degrees with active forward flexion 90 degrees and 4- out of 5 strength, active external rotation 20 degrees, passive 45 degrees, 4 out of 5 strength, 4 out of 5 strength of belly press test.  Mild residual tenderness over anterior and lateral proximal humerus in the region of the fracture site.  Resolving ecchymosis over arm.  Positive sensation to light touch all distributions left hand as well as proximal arm symmetric to the right.  Positive deltoid firing all 3 components.  Imaging:  3 view x-rays left shoulder from today's visit-AP, Scap Y, axillary lateral views, ordered and reviewed by me, indication left proximal humerus fracture, compared to prior office x-rays indicates significant improvement in alignment of the glenohumeral joint.  There does appear to be some subtle subchondral irregularity of the glenoid but overall humeral head and greater tuberosity fracture fragments appear to be in fairly stable position with mild evidence of callus formation noted at this time.  No evidence of significant residual pseudosubluxation as noted on previous imaging.    Assessment:        1. Closed fracture of proximal end of left humerus, unspecified fracture morphology, initial encounter           Plan:          Discussed treatment options at length with patient at today's visit.  Reviewed with patient findings from her x-ray as well as exam today.  She is doing much better at this point in time.  X-rays appear to show significant improvement in stability as well as glenohumeral alignment.  We will continue to progress with light stretching as well as light strengthening.  Follow up: 4 weeks repeat x-rays of left shoulder 3 views  Continue with physical therapy for range of motion and strength as noted above      Jinny Luciano  was in agreement with plan and had all questions answered.     Orders:  Orders Placed This Encounter   Procedures    XR Shoulder 2+ View  Left       Medications:  No orders of the defined types were placed in this encounter.      Followup:  No follow-ups on file.    Diagnoses and all orders for this visit:    1. Closed fracture of proximal end of left humerus, unspecified fracture morphology, initial encounter (Primary)  -     XR Shoulder 2+ View Left          Dictated utilizing Dragon dictation

## 2024-06-27 ENCOUNTER — OFFICE VISIT (OUTPATIENT)
Dept: ORTHOPEDIC SURGERY | Facility: CLINIC | Age: 64
End: 2024-06-27
Payer: MEDICARE

## 2024-06-27 VITALS — BODY MASS INDEX: 25.92 KG/M2 | HEIGHT: 69 IN | WEIGHT: 175 LBS

## 2024-06-27 DIAGNOSIS — S42.202A CLOSED FRACTURE OF PROXIMAL END OF LEFT HUMERUS, UNSPECIFIED FRACTURE MORPHOLOGY, INITIAL ENCOUNTER: Primary | ICD-10-CM

## 2024-06-27 NOTE — PROGRESS NOTES
"Subjective:     Patient ID: Jinny Luciano is a 64 y.o. female.    Chief Complaint:  Follow-up left shoulder pain, proximal humerus fracture-date of injury 3/9/2024  History of Present Illness  History of Present Illness  The patient returns to office today for follow-up evaluation in regards to the left shoulder.    The patient reports a significant improvement in her left shoulder condition, noting a positive progress and improvement with physical therapy in improving her motion and strength. She denies experiencing any numbness, tingling, fevers, chills, or sweats, and reports no significant pain in her shoulder. Last week, she was able to engage in golf and swing a golf club without significant difficulty.     Social History     Occupational History    Not on file   Tobacco Use    Smoking status: Never     Passive exposure: Never    Smokeless tobacco: Never   Vaping Use    Vaping status: Never Used   Substance and Sexual Activity    Alcohol use: Yes     Comment: 2 GLASSES OF WINE DAILY-RED     Drug use: No    Sexual activity: Yes     Partners: Male     Comment: mild dyspareunia 2/2 atrophy      Past Medical History:   Diagnosis Date    Urinary tract infection      Past Surgical History:   Procedure Laterality Date    APPENDECTOMY      1991     SECTION      , , , , ,         Family History   Problem Relation Age of Onset    Osteoporosis Mother     Heart disease Mother     Osteoporosis Sister     Breast cancer Maternal Grandmother     Hypertension Father     Arthritis Father     Hyperlipidemia Brother     Hyperlipidemia Brother          Review of Systems        Objective:  Vitals:    24 0847   Weight: 79.4 kg (175 lb)   Height: 175.3 cm (69\")         24  0847   Weight: 79.4 kg (175 lb)     Body mass index is 25.84 kg/m².  Physical Exam    Vital signs reviewed.   General: No acute distress, alert and oriented  Eyes: conjunctiva clear; pupils equally round and reactive  ENT: " external ears and nose atraumatic; oropharynx clear  CV: no peripheral edema  Resp: normal respiratory effort  Skin: no rashes or wounds; normal turgor  Psych: mood and affect appropriate; recent and remote memory intact          Physical Exam  Left shoulder- active forward flexion in the left shoulder is 145 degrees, passive forward flexion is 170 degrees, 4+ out of 5 strength on forward flexion, active external rotation 50 degrees, 4 plus out of 5 strength, internal rotation L1, 5 out of 5 strength on belly press test. Positive deltoid three components. No tenderness along the anterior and posterior glenohumeral joint line with no crepitus.         Imaging:  3 view x-rays of the left shoulder from today's visit, AP, Scap Y, axial lateral views, ordered reviewed by me, indication closed treatment left proximal humerus fracture, compared to prior office x-rays indicates stable alignment of proximal humerus three-part fracture with minimal proximal migration of greater tuberosity fracture segment, shortening and impaction with near anatomic varus valgus alignment of head segment, glenohumeral joint well aligned at this time.  No evidence of dislocation or subluxation.  Improved glenohumeral alignment compared to past x-rays    Assessment:        1. Closed fracture of proximal end of left humerus, unspecified fracture morphology, initial encounter           Plan:          Assessment & Plan  1.  Left proximal humerus fracture.  The patient's condition has shown significant improvement, as evidenced by her x-ray results. It is recommended that she persist with physical therapy until her active motion is symmetric to her passive motion, at which point a home exercise program can be transitioned. There are no restrictions regarding her activities, including basic activities, to avoid pain.    Follow-up  Follow-up visits will be scheduled as necessary, should she experience a recurrence of pain or a significant  exacerbation of issues. She expressed agreement and all her queries were addressed.    Jinny Luciano was in agreement with plan and had all questions answered.     Orders:  Orders Placed This Encounter   Procedures    XR Shoulder 2+ View Left       Medications:  No orders of the defined types were placed in this encounter.      Followup:  No follow-ups on file.    Diagnoses and all orders for this visit:    1. Closed fracture of proximal end of left humerus, unspecified fracture morphology, initial encounter (Primary)  -     XR Shoulder 2+ View Left          Dictated utilizing Dragon dictation     Patient or patient representative verbalized consent for the use of Ambient Listening during the visit with  Rajinder Garrett MD for chart documentation. 6/27/2024  09:07 EDT

## 2024-09-03 ENCOUNTER — APPOINTMENT (OUTPATIENT)
Dept: WOMENS IMAGING | Facility: HOSPITAL | Age: 64
End: 2024-09-03
Payer: MEDICARE

## 2024-09-03 PROCEDURE — 77067 SCR MAMMO BI INCL CAD: CPT | Performed by: RADIOLOGY

## 2024-09-03 PROCEDURE — 77063 BREAST TOMOSYNTHESIS BI: CPT | Performed by: RADIOLOGY

## 2025-05-13 DIAGNOSIS — Z80.0 FAMILY HISTORY OF COLON CANCER: ICD-10-CM

## 2025-05-13 DIAGNOSIS — Z12.11 COLON CANCER SCREENING: Primary | ICD-10-CM

## 2025-07-29 ENCOUNTER — ANESTHESIA (OUTPATIENT)
Dept: GASTROENTEROLOGY | Facility: HOSPITAL | Age: 65
End: 2025-07-29
Payer: MEDICARE

## 2025-07-29 ENCOUNTER — HOSPITAL ENCOUNTER (OUTPATIENT)
Facility: HOSPITAL | Age: 65
Setting detail: HOSPITAL OUTPATIENT SURGERY
Discharge: HOME | End: 2025-07-29
Attending: STUDENT IN AN ORGANIZED HEALTH CARE EDUCATION/TRAINING PROGRAM | Admitting: STUDENT IN AN ORGANIZED HEALTH CARE EDUCATION/TRAINING PROGRAM
Payer: MEDICARE

## 2025-07-29 ENCOUNTER — ANESTHESIA EVENT (OUTPATIENT)
Dept: GASTROENTEROLOGY | Facility: HOSPITAL | Age: 65
End: 2025-07-29
Payer: MEDICARE

## 2025-07-29 VITALS
BODY MASS INDEX: 25.48 KG/M2 | RESPIRATION RATE: 16 BRPM | SYSTOLIC BLOOD PRESSURE: 132 MMHG | DIASTOLIC BLOOD PRESSURE: 78 MMHG | HEART RATE: 66 BPM | TEMPERATURE: 98.1 F | WEIGHT: 172 LBS | HEIGHT: 69 IN | OXYGEN SATURATION: 100 %

## 2025-07-29 DIAGNOSIS — Z80.0 FAMILY HISTORY OF COLON CANCER: ICD-10-CM

## 2025-07-29 DIAGNOSIS — Z12.11 COLON CANCER SCREENING: ICD-10-CM

## 2025-07-29 PROBLEM — K57.90 DIVERTICULOSIS: Chronic | Status: ACTIVE | Noted: 2025-07-29

## 2025-07-29 PROCEDURE — 25010000002 PROPOFOL 200 MG/20ML EMULSION: Performed by: NURSE ANESTHETIST, CERTIFIED REGISTERED

## 2025-07-29 PROCEDURE — 25010000002 GLYCOPYRROLATE 0.2 MG/ML SOLUTION: Performed by: NURSE ANESTHETIST, CERTIFIED REGISTERED

## 2025-07-29 PROCEDURE — 25010000002 LIDOCAINE 2% SOLUTION: Performed by: NURSE ANESTHETIST, CERTIFIED REGISTERED

## 2025-07-29 PROCEDURE — 25010000002 PROPOFOL 1000 MG/100ML EMULSION: Performed by: NURSE ANESTHETIST, CERTIFIED REGISTERED

## 2025-07-29 PROCEDURE — 25810000003 LACTATED RINGERS PER 1000 ML: Performed by: NURSE ANESTHETIST, CERTIFIED REGISTERED

## 2025-07-29 PROCEDURE — 25810000003 LACTATED RINGERS PER 1000 ML: Performed by: STUDENT IN AN ORGANIZED HEALTH CARE EDUCATION/TRAINING PROGRAM

## 2025-07-29 RX ORDER — PROPOFOL 10 MG/ML
INJECTION, EMULSION INTRAVENOUS CONTINUOUS PRN
Status: DISCONTINUED | OUTPATIENT
Start: 2025-07-29 | End: 2025-07-29 | Stop reason: SURG

## 2025-07-29 RX ORDER — IBUPROFEN 200 MG
200 TABLET ORAL EVERY 6 HOURS PRN
COMMUNITY

## 2025-07-29 RX ORDER — OMEPRAZOLE 20 MG/1
20 CAPSULE, DELAYED RELEASE ORAL DAILY
COMMUNITY

## 2025-07-29 RX ORDER — SODIUM CHLORIDE, SODIUM LACTATE, POTASSIUM CHLORIDE, CALCIUM CHLORIDE 600; 310; 30; 20 MG/100ML; MG/100ML; MG/100ML; MG/100ML
INJECTION, SOLUTION INTRAVENOUS CONTINUOUS PRN
Status: DISCONTINUED | OUTPATIENT
Start: 2025-07-29 | End: 2025-07-29 | Stop reason: SURG

## 2025-07-29 RX ORDER — SODIUM CHLORIDE, SODIUM LACTATE, POTASSIUM CHLORIDE, CALCIUM CHLORIDE 600; 310; 30; 20 MG/100ML; MG/100ML; MG/100ML; MG/100ML
20 INJECTION, SOLUTION INTRAVENOUS ONCE
Status: COMPLETED | OUTPATIENT
Start: 2025-07-29 | End: 2025-07-29

## 2025-07-29 RX ORDER — LIDOCAINE HYDROCHLORIDE 10 MG/ML
0.5 INJECTION, SOLUTION INFILTRATION; PERINEURAL ONCE AS NEEDED
Status: DISCONTINUED | OUTPATIENT
Start: 2025-07-29 | End: 2025-07-29 | Stop reason: HOSPADM

## 2025-07-29 RX ORDER — SODIUM CHLORIDE 0.9 % (FLUSH) 0.9 %
10 SYRINGE (ML) INJECTION AS NEEDED
Status: DISCONTINUED | OUTPATIENT
Start: 2025-07-29 | End: 2025-07-29 | Stop reason: HOSPADM

## 2025-07-29 RX ORDER — LIDOCAINE HYDROCHLORIDE 20 MG/ML
INJECTION, SOLUTION INFILTRATION; PERINEURAL AS NEEDED
Status: DISCONTINUED | OUTPATIENT
Start: 2025-07-29 | End: 2025-07-29 | Stop reason: SURG

## 2025-07-29 RX ORDER — GLYCOPYRROLATE 0.2 MG/ML
INJECTION INTRAMUSCULAR; INTRAVENOUS AS NEEDED
Status: DISCONTINUED | OUTPATIENT
Start: 2025-07-29 | End: 2025-07-29 | Stop reason: SURG

## 2025-07-29 RX ORDER — PROPOFOL 10 MG/ML
INJECTION, EMULSION INTRAVENOUS AS NEEDED
Status: DISCONTINUED | OUTPATIENT
Start: 2025-07-29 | End: 2025-07-29 | Stop reason: SURG

## 2025-07-29 RX ADMIN — PROPOFOL INJECTABLE EMULSION 80 MG: 10 INJECTION, EMULSION INTRAVENOUS at 11:18

## 2025-07-29 RX ADMIN — SODIUM CHLORIDE, POTASSIUM CHLORIDE, SODIUM LACTATE AND CALCIUM CHLORIDE 20 ML/HR: 600; 310; 30; 20 INJECTION, SOLUTION INTRAVENOUS at 10:03

## 2025-07-29 RX ADMIN — PROPOFOL 140 MCG/KG/MIN: 10 INJECTION, EMULSION INTRAVENOUS at 11:18

## 2025-07-29 RX ADMIN — GLYCOPYRROLATE 0.2 MG: 0.2 INJECTION INTRAMUSCULAR; INTRAVENOUS at 11:26

## 2025-07-29 RX ADMIN — SODIUM CHLORIDE, POTASSIUM CHLORIDE, SODIUM LACTATE AND CALCIUM CHLORIDE: 600; 310; 30; 20 INJECTION, SOLUTION INTRAVENOUS at 11:11

## 2025-07-29 RX ADMIN — LIDOCAINE HYDROCHLORIDE 3 ML: 20 INJECTION, SOLUTION INFILTRATION; PERINEURAL at 11:18

## 2025-07-29 NOTE — ANESTHESIA POSTPROCEDURE EVALUATION
"Patient: Jinny Luciano    Procedure Summary       Date: 07/29/25 Room / Location: [facility] ENDOSCOPY 6 / [facility] ENDOSCOPY    Anesthesia Start: 1111 Anesthesia Stop: 1148    Procedure: COLONOSCOPY to cecum Diagnosis:       Colon cancer screening      Family history of colon cancer      (Colon cancer screening [Z12.11])      (Family history of colon cancer [Z80.0])    Surgeons: Negrita Wright MD Provider: Jesús Ballard MD    Anesthesia Type: MAC ASA Status: 2            Anesthesia Type: MAC    Vitals  Vitals Value Taken Time   /72 07/29/25 11:46   Temp     Pulse 70 07/29/25 11:53   Resp 15 07/29/25 11:46   SpO2 96 % 07/29/25 11:53   Vitals shown include unfiled device data.        Post Anesthesia Care and Evaluation    Patient location during evaluation: bedside  Patient participation: complete - patient participated  Level of consciousness: sleepy but conscious  Pain score: 0  Pain management: adequate    Airway patency: patent  Anesthetic complications: No anesthetic complications    Cardiovascular status: acceptable  Respiratory status: acceptable  Hydration status: acceptable    Comments: /72 (BP Location: Left arm, Patient Position: Lying)   Pulse 71   Temp 36.7 °C (98.1 °F) (Oral)   Resp 15   Ht 175.3 cm (69\")   Wt 78 kg (172 lb)   SpO2 94%   BMI 25.40 kg/m²       "

## 2025-07-29 NOTE — DISCHARGE INSTRUCTIONS
For the next 24 hours patient needs to be with a responsible adult.    For THE REST OF TODAY DO NOT drive, operate machinery, appliances, drink alcohol, make important decisions or sign legal documents.    Start with a light or bland diet if you are feeling sick to your stomach otherwise advance to regular diet as tolerated.    Follow recommendations on procedure report if provided by your doctor.    Call Dr Wright     Problems may include but not limited to: large amounts of bleeding, trouble breathing, repeated vomiting, severe unrelieved pain, fever or chills.      If biopsies or polyps were taken, MD will call you with the results in about 7 days. If you don't hear from the MD in 2 weeks, call the number above.

## 2025-07-29 NOTE — H&P
GENERAL SURGERY HISTORY AND PHYSICAL     CHIEF COMPLAINT:    Screening colonoscopy   History of colon cancer in maternal uncle    HPI:    Jinny Luciano is a 65 y.o. female here for screening colonoscopy.  She has a family history of colon cancer in her maternal uncle.  She had a colonoscopy approximately 2011 and denies any history of polyps.  She is never undergone colon surgery.  She is not on any blood thinners.  She states she tolerated her bowel prep without issue and is having clear yellow stools.    ALLERGIES:   No Known Allergies    MEDICATIONS:    Reviewed in Epic       PHYSICAL EXAM:   Constitutional: Well-developed well-nourished, no acute distress  HEENT: Conjunctiva normal, sclera nonicteric, hearing grossly normal, oral mucosa moist  Neck: Supple, trachea midline  Respiratory: Normal inspiratory effort  Cardiovascular: Regular rate, no peripheral edema   Gastrointestinal: Abd soft, nondistended, nontender  Skin:  Warm, dry, no rash on visualized skin surfaces  Musculoskeletal: Symmetric strength  Psychiatric: Alert and oriented ×3, normal affect     ASSESSMENT/PLAN:  Jinny Luciano is a 65 y.o. female here for high risk screening colonoscopy.     I discussed recommendation for colonoscopy.  I obtained informed consent, discussing with the patient the procedure details, benefits, risks, alternatives, and postprocedure expectations. Risks discussed include unexpected bleeding possibly requiring hospitalization, transfusion, unplanned repeat colonoscopy or other intervention; injury including bowel perforation or injury to organs surrounding the colon possibly requiring surgical treatment; missed lesions and need for follow up in the future if patient develops symptoms such as abdominal pain, change in bowel habits, blood in stool, or unintentional weight loss; poor bowel prep precluding adequate screening; aspiration or other complications of sedation/anesthesia).  I provided opportunity for the patient to  ask questions and answered them.  The patient wishes to proceed with colonoscopy.    Negrita Wright MD  General, Robotic and Endoscopic Surgery  [facility]    [address], [address]  [place], [place], [address]  P: [phone]  F: [phone]

## 2025-07-29 NOTE — ANESTHESIA PREPROCEDURE EVALUATION
Anesthesia Evaluation     Patient summary reviewed and Nursing notes reviewed   NPO Solid Status: > 8 hours  NPO Liquid Status: > 4 hours           Airway   Mallampati: II  Neck ROM: full  No difficulty expected  Dental - normal exam     Pulmonary     breath sounds clear to auscultation  Cardiovascular     Rhythm: regular        Neuro/Psych  GI/Hepatic/Renal/Endo      Musculoskeletal     Abdominal    Substance History      OB/GYN          Other                      Anesthesia Plan    ASA 2     MAC     intravenous induction     Anesthetic plan, risks, benefits, and alternatives have been provided, discussed and informed consent has been obtained with: patient.    CODE STATUS:

## (undated) DEVICE — KT ORCA ORCAPOD DISP STRL

## (undated) DEVICE — TUBING, SUCTION, 1/4" X 10', STRAIGHT: Brand: MEDLINE

## (undated) DEVICE — LN SMPL CO2 SHTRM SD STREAM W/M LUER

## (undated) DEVICE — ADAPT CLN BIOGUARD AIR/H2O DISP

## (undated) DEVICE — SENSR O2 OXIMAX FNGR A/ 18IN NONSTR

## (undated) DEVICE — CANN O2 ETCO2 FITS ALL CONN CO2 SMPL A/ 7IN DISP LF